# Patient Record
Sex: FEMALE | Race: BLACK OR AFRICAN AMERICAN | Employment: UNEMPLOYED | URBAN - METROPOLITAN AREA
[De-identification: names, ages, dates, MRNs, and addresses within clinical notes are randomized per-mention and may not be internally consistent; named-entity substitution may affect disease eponyms.]

---

## 2019-02-19 ENCOUNTER — HOSPITAL ENCOUNTER (EMERGENCY)
Age: 34
Discharge: HOME | End: 2019-02-19
Attending: EMERGENCY MEDICINE
Payer: SELF-PAY

## 2019-02-19 VITALS
TEMPERATURE: 97.7 F | DIASTOLIC BLOOD PRESSURE: 86 MMHG | OXYGEN SATURATION: 100 % | HEART RATE: 99 BPM | RESPIRATION RATE: 18 BRPM | SYSTOLIC BLOOD PRESSURE: 121 MMHG

## 2019-02-19 DIAGNOSIS — K04.7 DENTAL INFECTION: Primary | ICD-10-CM

## 2019-02-19 DIAGNOSIS — Z76.89 RETURN TO WORK EVALUATION: ICD-10-CM

## 2019-02-19 PROCEDURE — 99282 EMERGENCY DEPT VISIT SF MDM: CPT

## 2019-02-19 RX ORDER — PENICILLIN V POTASSIUM 500 MG/1
500 TABLET, FILM COATED ORAL 4 TIMES DAILY
Qty: 40 TAB | Refills: 0 | Status: SHIPPED | OUTPATIENT
Start: 2019-02-19 | End: 2019-03-01

## 2019-02-19 NOTE — LETTER
NOTIFICATION RETURN TO WORK / SCHOOL 
 
2/19/2019 7:47 PM 
 
Ms. Annamaria Boggs [address] [facility] [phone] To Whom It May Concern: 
 
Annamaria Boggs is currently under the care of [facility] EMERGENCY DEPT. She will return to work/school on: 2/19/19 If there are questions or concerns please have the patient contact our office. Sincerely, Micheal Dillard PA-C

## 2019-02-20 NOTE — ED PROVIDER NOTES
EMERGENCY DEPARTMENT HISTORY AND PHYSICAL EXAM 
 
7:38 PM 
 
 
Date: 2/19/2019 Patient Name: Christopher Segundo History of Presenting Illness Chief Complaint Patient presents with  Dental Pain  Letter for School/Work History Provided By: Patient Additional History (Context): Christopher Segundo is a 28 y.o. female with No significant past medical history who presents with dental pain. Pt states she has aching pain in her left-sided teeth. She has taken [facility] powders with no relief. She reports also nausea, vomiting, and diarrhea that began this morning and has resolved. She states she drank gatorade and ginger ale with relief. Pt states she needs clearance to go back to work. PCP: No primary care provider on file. Chief Complaint: Dental pain Duration:  N/A Timing:  Worsening Location: Left-side Quality: Aching Severity: N/A Modifying Factors: BC powder with no relief Associated Symptoms: nausea/vomiting/diarrhea (all have resolved) Past History Past Medical History: 
History reviewed. No pertinent past medical history. Past Surgical History: 
History reviewed. No pertinent surgical history. Family History: 
History reviewed. No pertinent family history. Social History: 
Social History Tobacco Use  Smoking status: Never Smoker Substance Use Topics  Alcohol use: Yes  Drug use: No  
 
 
Allergies: 
No Known Allergies Review of Systems Review of Systems Constitutional: Negative for chills and fever. HENT: Positive for dental problem. Negative for congestion, ear pain, sinus pain and sore throat. Respiratory: Negative for cough and shortness of breath. Cardiovascular: Negative for chest pain and leg swelling. Gastrointestinal: Positive for diarrhea (resolved), nausea (resolved) and vomiting (resolved). Negative for abdominal pain and constipation.   
Genitourinary: Negative for dysuria, hematuria, vaginal bleeding and vaginal discharge. All other systems reviewed and are negative. Physical Exam  
 
Visit Vitals /86 (BP 1 Location: Right arm, BP Patient Position: At rest) Pulse 99 Temp 97.7 °F (36.5 °C) Resp 18 LMP 02/11/2019 (Approximate) SpO2 100% Physical Exam  
Constitutional: She is oriented to person, place, and time. She appears well-developed and well-nourished. No distress. HENT:  
Head: Normocephalic and atraumatic. Nose: Nose normal.  
Mouth/Throat: Mucous membranes are normal. She does not have dentures. No oral lesions. No trismus in the jaw. Abnormal dentition. Dental caries present. No dental abscesses, uvula swelling or lacerations. Oropharyngeal exudate present. No posterior oropharyngeal edema, posterior oropharyngeal erythema or tonsillar abscesses. Eyes: Conjunctivae and EOM are normal. Pupils are equal, round, and reactive to light. Neck: Normal range of motion. Neck supple. Cardiovascular: Normal rate and regular rhythm. Pulmonary/Chest: Effort normal and breath sounds normal. No respiratory distress. Abdominal: Soft. Musculoskeletal: Normal range of motion. Neurological: She is alert and oriented to person, place, and time. No cranial nerve deficit. Coordination normal.  
Skin: Skin is warm. No rash noted. She is not diaphoretic. Psychiatric: She has a normal mood and affect. Her behavior is normal.  
Nursing note and vitals reviewed. Diagnostic Study Results Labs - No results found for this or any previous visit (from the past 12 hour(s)). Radiologic Studies - No orders to display Medical Decision Making It should be noted that I, Abner Bence, PA-C will be the provider of record for this patient. I reviewed the vital signs, available nursing notes, past medical history, past surgical history, family history and social history. Vital Signs-Reviewed the patient's vital signs. Pulse Oximetry Analysis -  100% on room air, normal 
 
Cardiac Monitor: 
Rate: 99 BPM 
 
 
Records Reviewed: Nursing Notes and Old Medical Records (Time of Review: 7:38 PM) ED Course: Progress Notes, Reevaluation, and Consults: 
 
Provider Notes (Medical Decision Making):  
Pt here requesting note to return to work tomorrow. States she had stomach bug earlier today but has since resolved and wants to return to work tomorrow. Also wants teeth checked while she is here. Pt presents ambulatory in NAD, well-hydrated, non-toxic in appearance, with normal vitals. Exam reveals diffusely poor dentition without localized induration or fluctuance to suggest drainable abscess. No sublingual/submandibular induration, trismus, or stridor. Normal speech. Handling oral secretions without difficulty. Pt with full ROM of neck. Low suspicion for Shoaib's angina or deep space infection. Will DC home with PCN, dentist referral.  Sx from [address] earlier today resolved. Pt is strongly advised to follow-up with dentist in short period of time as they will need definitive management. Diagnosis Clinical Impression: 1. Dental infection 2. Return to work evaluation Disposition: Discharge Follow-up Information None Medication List  
  
You have not been prescribed any medications. _______________________________ Scribe Attestation Sergio Mckoy acting as a scribe for and in the presence of Fauzia Herrera February 19, 2019 at 7:44 PM 
    
Provider Attestation:     
I personally performed the services described in the documentation, reviewed the documentation, as recorded by the scribe in my presence, and it accurately and completely records my words and actions. February 19, 2019 at 7:44 PM - Luis Courtney PA-C   
 
 
_______________________________

## 2019-02-20 NOTE — DISCHARGE INSTRUCTIONS
Patient Education     Dental Pain: After Your Visit  Your Care Instructions  The most common cause of dental pain is tooth decay. It can also be caused by an infection of the tooth (abscess) or gum, a tooth that has not broken all the way through the gum (impacted tooth), or a problem with the nerve-filled center of the tooth. Follow-up care is a key part of your treatment and safety. Be sure to make and go to all appointments, and call your doctor if you are having problems. Its also a good idea to know your test results and keep a list of the medicines you take. How can you care for yourself at home? · Contact a dentist for follow-up care. · Put ice or a cold pack on the outside of your mouth for 10 to 20 minutes at a time to reduce pain and swelling. Put a thin cloth between the ice and your skin. · Take an over-the-counter pain medicine, such as acetaminophen (Tylenol), ibuprofen (Advil, Motrin), or naproxen (Aleve). Read and follow all instructions on the label. · Do not take two or more pain medicines at the same time unless the doctor told you to. Many pain medicines have acetaminophen, which is Tylenol. Too much acetaminophen (Tylenol) can be harmful. · Rinse your mouth with warm salt water every 2 hours to help relieve pain and swelling from an infected tooth. Mix 1 teaspoon of salt in 8 ounces of water. · If your doctor prescribed antibiotics, take them as directed. Do not stop taking them just because you feel better. You need to take the full course of antibiotics. When should you call for help? Call your doctor now or seek immediate medical care if:  · You have signs of infection, such as:  ¨ Increased pain, swelling, warmth, or redness. ¨ Pus draining from the gum, tooth, or face. ¨ A fever. Watch closely for changes in your health, and be sure to contact your doctor if:  · You do not get better as expected. Where can you learn more?    Go to [employer]  Enter V264 in the search box to learn more about \"Dental Pain: After Your Visit. \"   © 6810-8339 [employer]. Care instructions adapted under license by [facility] (which disclaims liability or warranty for this information). This care instruction is for use with your licensed healthcare professional. If you have questions about a medical condition or this instruction, always ask your healthcare professional. [place] [address] any warranty or liability for your use of this information. Content Version: 54.4.301166;  Last Revised: May 17, 2013

## 2019-02-20 NOTE — ED NOTES
I have reviewed discharge instructions with the patient. The patient verbalized understanding. Patient stated she wanted to keep her arm band. Patient will ambulate independently out of care area

## 2019-02-20 NOTE — ED TRIAGE NOTES
Pt here for work note. Vomiting and diarrhea this morning. Feeling better. Also wants tooth pain addressed.